# Patient Record
Sex: MALE | URBAN - METROPOLITAN AREA
[De-identification: names, ages, dates, MRNs, and addresses within clinical notes are randomized per-mention and may not be internally consistent; named-entity substitution may affect disease eponyms.]

---

## 2017-02-15 NOTE — PATIENT DISCUSSION
Myopia Counseling: The diagnosis of myopia (nearsightedness) was discussed with the patient. I explained to the patient that people who are nearsighted may be at an increased risk of a retinal detachment. Possible symptoms of retinal detachment including new onset of significant floaters or flashes or a sudden decrease in vision were reviewed with the patient. The patient understands that any of these symptoms require an immediate call to the office for an examination that day. Options for the correction of the patient's myopia were discussed may include glasses, contacts or elective refractive surgery. Return for follow-up as scheduled.

## 2017-02-15 NOTE — PATIENT DISCUSSION
MYOPIA OU: PRESCRIBED GLASSES AND OR CONTACTS. FOLLOW-UP AS SCHEDULED. RX STABLE. REFER TO TRAM FOR LASIK CONSULT.

## 2018-01-23 NOTE — PATIENT DISCUSSION
The patient has evidence of both cataract and Fuchs’ corneal endothelial dystrophy. Each is contributing to the patient's visual complaints. Fuchs’ corneal endothelial dystrophy is a progressive inherited disease in which corneal endothelial dysfunction results in corneal edema. Unfortunately, the trauma of cataract surgery is likely to further decompensate the cornea. I have recommended elective cataract extraction combined with corneal transplantation. I have extensively discussed with the patient the associated risks including infection, hemorrhage, and rejection of the transplant. Typically, the recovery of vision is slow over the course of several months.

## 2018-02-27 NOTE — PATIENT DISCUSSION
Continue drops OS. Ilevro use until gone , Pred Start QID 02/28/18 X 1 week ,  TID X 1 week , BID X 1 week , QD X 1week.

## 2018-06-15 ENCOUNTER — IMPORTED ENCOUNTER (OUTPATIENT)
Dept: URBAN - METROPOLITAN AREA CLINIC 9 | Facility: CLINIC | Age: 43
End: 2018-06-15

## 2018-06-19 NOTE — PATIENT DISCUSSION
MYOPIA/ASTIGMATISM, OU: PRESCRIBED GLASSES AND CONTACTS. TRIAL LENSES ORDERED. FOLLOW-UP AS SCHEDULED.

## 2019-08-05 NOTE — PATIENT DISCUSSION
wait 1 month. No Residual Tumor Seen Histology Text: There were no malignant cells seen in the sections examined.

## 2019-08-07 NOTE — PATIENT DISCUSSION
----- Message from Day Sullivan MD sent at 6/28/2019  6:25 AM CDT -----  Good news - stool test is negative for blood.   Recommended lid scrubs bid ou.

## 2020-04-15 NOTE — PATIENT DISCUSSION
MYOPIA OU: PRESCRIBED GLASSES AND OR CONTACTS. FOLLOW-UP AS SCHEDULED. RX STABLE. NOT INTERESTED IN LASIK AT THIS TIME.  PT NOT HAVING ANY TROUBLE WITH CURRENT CTL RX. IF PT STARTS TO HAVE TROUBLE, SUGGEST UPDATING LENS RX.

## 2020-05-19 NOTE — PATIENT DISCUSSION
CONJUNCTIVITIS (ALLERGIC), OS&gt;OD:  PRESCRIBE LOTEMAX ROCHELLE QHS X 2 WEEKS. STAY OUT OF CONTACTS FOR 1 WEEK. RETURN FOR FOLLOW-UP AS SCHEDULED.

## 2020-05-19 NOTE — PATIENT DISCUSSION
New Prescription: Lotemax (loteprednol etabonate): ointment: 0.5% a small amount at bedtime into both eyes 05-

## 2020-06-02 NOTE — PATIENT DISCUSSION
Continue: Lotemax (loteprednol etabonate): ointment: 0.5% a small amount at bedtime into both eyes 05-

## 2020-06-02 NOTE — PATIENT DISCUSSION
CONJUNCTIVITIS (ALLERGIC), OS&gt;OD: PRESCRIBE PATADAY,  LOTEMAX ROCHELLE QHS X 1 WEEK THEN PRN. OKAY TO WEAR CONTACTS.

## 2020-07-06 NOTE — PATIENT DISCUSSION
RECURRENT CONJUNCTIVITIS (ALLERGIC), OS&gt;OD: PRESCRIBE OTC ALLERGY GTTS DAILY, LOTEMAX ROCHELLE QHS X 1 WEEK THEN PRN. OKAY TO WEAR CONTACTS.
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
## 2021-09-15 NOTE — PATIENT DISCUSSION
HX ORBITAL FLOOR FX OS - IF PT IS CONCERNED ABOUT RECURRENT LL SWELLING. FOLLOW UP WITH DR Seema Johnson.
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
## 2021-10-16 ASSESSMENT — TONOMETRY
OD_IOP_MMHG: 13
OS_IOP_MMHG: 12

## 2021-10-16 ASSESSMENT — VISUAL ACUITY
OD_CC: 20/20 SN
OS_CC: 20/20 SN

## 2022-07-06 RX ORDER — PROMETHAZINE HYDROCHLORIDE AND CODEINE PHOSPHATE 6.25; 1 MG/5ML; MG/5ML
SYRUP ORAL
COMMUNITY

## 2022-07-06 RX ORDER — AZITHROMYCIN 250 MG/1
TABLET, FILM COATED ORAL
COMMUNITY

## 2022-08-22 NOTE — PATIENT DISCUSSION
MILD DRY EYES: PRESCRIBED ARTIFICIAL TEARS BID - QID, OU RETURN FOR FOLLOW-UP AS SCHEDULED OR SOONER IF SYMPTOMS WORSEN. GDMA1/Gestational Diabetes/Oligohydramnios/Hypertensive Disorder

## 2022-08-31 ENCOUNTER — NEW PATIENT (OUTPATIENT)
Dept: URBAN - METROPOLITAN AREA CLINIC 17 | Facility: CLINIC | Age: 47
End: 2022-08-31

## 2022-08-31 DIAGNOSIS — H04.123: ICD-10-CM

## 2022-08-31 DIAGNOSIS — H52.4: ICD-10-CM

## 2022-08-31 PROCEDURE — 92015 DETERMINE REFRACTIVE STATE: CPT

## 2022-08-31 PROCEDURE — 92004 COMPRE OPH EXAM NEW PT 1/>: CPT

## 2022-08-31 ASSESSMENT — TONOMETRY
OS_IOP_MMHG: 12
OD_IOP_MMHG: 12

## 2022-08-31 ASSESSMENT — VISUAL ACUITY
OU_SC: J1
OU_CC: J2
OS_SC: 20/20-2
OD_SC: 20/25-1

## 2024-03-01 ENCOUNTER — ESTABLISHED PATIENT (OUTPATIENT)
Dept: URBAN - METROPOLITAN AREA CLINIC 17 | Facility: CLINIC | Age: 49
End: 2024-03-01

## 2024-03-01 DIAGNOSIS — H52.4: ICD-10-CM

## 2024-03-01 DIAGNOSIS — H04.123: ICD-10-CM

## 2024-03-01 PROCEDURE — 92015 DETERMINE REFRACTIVE STATE: CPT

## 2024-03-01 PROCEDURE — 99214 OFFICE O/P EST MOD 30 MIN: CPT

## 2024-03-01 ASSESSMENT — TONOMETRY
OS_IOP_MMHG: 13
OD_IOP_MMHG: 11

## 2024-03-01 ASSESSMENT — VISUAL ACUITY
OD_CC: 20/25-1
OS_CC: 20/25